# Patient Record
Sex: FEMALE | Race: WHITE
[De-identification: names, ages, dates, MRNs, and addresses within clinical notes are randomized per-mention and may not be internally consistent; named-entity substitution may affect disease eponyms.]

---

## 2017-07-08 ENCOUNTER — HOSPITAL ENCOUNTER (EMERGENCY)
Dept: HOSPITAL 25 - UCCORT | Age: 42
Discharge: HOME | End: 2017-07-08
Payer: COMMERCIAL

## 2017-07-08 VITALS — DIASTOLIC BLOOD PRESSURE: 89 MMHG | SYSTOLIC BLOOD PRESSURE: 129 MMHG
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
## 2017-07-08 DIAGNOSIS — S76.011A: Primary | ICD-10-CM
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
## 2017-07-08 DIAGNOSIS — S73.101A: ICD-10-CM

## 2017-07-08 PROCEDURE — 96372 THER/PROPH/DIAG INJ SC/IM: CPT

## 2017-07-08 PROCEDURE — 99211 OFF/OP EST MAY X REQ PHY/QHP: CPT

## 2017-07-08 PROCEDURE — G0463 HOSPITAL OUTPT CLINIC VISIT: HCPCS

## 2017-07-08 NOTE — UC
Hip/Pelvis Pain





- HPI Summary


HPI Summary: 





Patient was dancing, right hip gave out and she fell, pain and tingling down 

the right leg.





- History Of Current Complaint


Chief Complaint: UCLowerExtremity


Stated Complaint: RIGHT HIP PAIN


Time Seen by Provider: 07/08/17 20:12


Hx Obtained From: Patient


Hx Last Menstrual Period: TUBAL


Pregnant?: No


Onset/Duration: Sudden Onset, Lasting Days


Timing: Constant


Severity Initially: Moderate


Severity Currently: Moderate


Location: Discrete At: - right hip


Character Of Pain: Spasmodic, Stiffness


Aggravating Factor(s): Movement, Weight Bearing


Alleviating Factor(s): Nothing


Associated Signs And Symptoms: Positive: Weakness





- Risk Factors


Septic Arthritis Risk Factor: Negative





- Allergies/Home Medications


Allergies/Adverse Reactions: 


 Allergies











Allergy/AdvReac Type Severity Reaction Status Date / Time


 


No Known Allergies Allergy   Verified 07/08/17 19:42











Home Medications: 


 Home Medications





Ibuprofen TAB* [Motrin TAB* 800 MG] 800 mg PO Q6H PRN 07/08/17 [History 

Confirmed 07/08/17]











PMH/Surg Hx/FS Hx/Imm Hx


Previously Healthy: Yes





- Surgical History


Surgical History: Yes


Surgery Procedure, Year, and Place: gastric bypass, gallbladder removed.





- Family History


Known Family History: 


   Negative: Diabetes





- Social History


Alcohol Use: None


Substance Use Type: None


Smoking Status (MU): Never Smoked Tobacco





Review of Systems


Constitutional: Negative


Skin: Negative


Eyes: Negative


ENT: Negative


Respiratory: Negative


Cardiovascular: Negative


Gastrointestinal: Negative


Genitourinary: Negative


Motor: Negative


Neurovascular: Negative


Musculoskeletal: Arthralgia, Decreased ROM, Myalgia


Neurological: Negative


Psychological: Negative


All Other Systems Reviewed And Are Negative: Yes





Physical Exam


Triage Information Reviewed: Yes


Appearance: Well-Appearing, Well-Nourished, Pain Distress


Vital Signs: 


 Initial Vital Signs











Temp  97.3 F   07/08/17 19:35


 


Pulse  77   07/08/17 19:35


 


Resp  12   07/08/17 19:35


 


BP  129/89   07/08/17 19:35


 


Pulse Ox  100   07/08/17 19:35











Vital Signs Reviewed: Yes


Eye Exam: Normal


ENT Exam: Normal


ENT: Positive: Pharynx normal, Pharyngeal erythema, TMs normal


Dental Exam: Normal


Neck exam: Normal


Respiratory Exam: Normal


Cardiovascular Exam: Normal


Abdominal Exam: Normal


Bowel Sounds: Positive: Present


Musculoskeletal: Positive: No Edema, Strength Limited @ - in right hip, knee 

ROM normal, right hip limited in flexion and extention due to pain, ROM Limited 

@


Neurological Exam: Normal


Psychological Exam: Normal


Skin Exam: Normal





Hip Injury Course/Dx





- Course


Course Of Treatment: hx obtained, exam performed, meds reviewed toradol given





- Differential Dx/Diagnosis


Differential Diagnosis/HQI/PQRI: Contusion, Dislocation, Fracture, Sciatica, 

Sprain, Strain


Provider Diagnoses: right hip strain.  right hip sprain





Discharge





- Discharge Plan


Condition: Stable


Disposition: HOME


Patient Education Materials:  Hip Pain (ED)


Referrals: 


Marti Canseco PA [Primary Care Provider] - 


Boom Wilkinson MD [Medical Doctor] - 


Additional Instructions: 


1. rest, ice or heat as tolerated, continue with ibuprofen starting tomorrow 

for pain. 


2. If pain and stability are not improving over the next week follow up with Dr Wilkinson the orthopedic,

## 2017-07-08 NOTE — RAD
HISTORY: Fall



COMPARISONS: None



VIEWS: 3, Frontal view of the pelvis with frontal and frog-leg views of the left hip



FINDINGS:



BONE DENSITY: Normal.

BONES: There is no displaced fracture.    

JOINTS: There is no arthropathy.    

ALIGNMENT: There is no dislocation. 

SOFT TISSUES: Unremarkable.



OTHER FINDINGS: None.



IMPRESSION: 

NO RADIOGRAPHIC EVIDENCE FOR HIP FRACTURE. AS X-RAYS MAY BE NEGATIVE WITH NONDISPLACED HIP

FRACTURE, IF THERE IS PERSISTENT CLINICAL CONCERN, RECOMMEND CONSIDERATION OF MRI. IN THE

SETTING OF CONTRAINDICATION TO MRI OR LIMITATION IN EMERGENT ACCESS TO MRI, CT WOULD BE

SUGGESTED.

## 2017-08-27 ENCOUNTER — HOSPITAL ENCOUNTER (EMERGENCY)
Dept: HOSPITAL 25 - UCCORT | Age: 42
Discharge: HOME | End: 2017-08-27
Payer: COMMERCIAL

## 2017-08-27 VITALS — SYSTOLIC BLOOD PRESSURE: 117 MMHG | DIASTOLIC BLOOD PRESSURE: 64 MMHG

## 2017-08-27 DIAGNOSIS — Z98.84: ICD-10-CM

## 2017-08-27 DIAGNOSIS — M77.9: Primary | ICD-10-CM

## 2017-08-27 PROCEDURE — 99212 OFFICE O/P EST SF 10 MIN: CPT

## 2017-08-27 PROCEDURE — G0463 HOSPITAL OUTPT CLINIC VISIT: HCPCS

## 2017-08-27 NOTE — UC
Hand/Wrist HPI





- HPI Summary


HPI Summary: 





patient has had increased right hand and thumb pain, she uses her hands alot a 

work, cant move the thumb without pain.





- History Of Current Complaint


Chief Complaint: UCUpperExtremity


Stated Complaint: RIGHT HAND PAIN


Time Seen by Provider: 08/27/17 13:41


Hx Obtained From: Patient


Hx Last Menstrual Period: 8/15/17


Pregnant?: No


Onset/Duration: Gradual Onset, Lasting Days


Severity Initially: Moderate


Severity Currently: Severe


Character Of Pain: Aching, Stiffness


Aggravating Factor(s): Movement


Associated Signs And Symptoms: Positive: Weakness


Related History: Dominant Hand Right





- Allergies/Home Medications


Allergies/Adverse Reactions: 


 Allergies











Allergy/AdvReac Type Severity Reaction Status Date / Time


 


No Known Allergies Allergy   Verified 08/27/17 13:30














PMH/Surg Hx/FS Hx/Imm Hx


Previously Healthy: Yes





- Surgical History


Surgical History: Yes


Surgery Procedure, Year, and Place: gastric bypass, gallbladder removed.





- Family History


Known Family History: 


   Negative: Diabetes





- Social History


Alcohol Use: None


Substance Use Type: None


Smoking Status (MU): Never Smoked Tobacco





Review of Systems


Constitutional: Negative


Skin: Negative


Eyes: Negative


ENT: Negative


Respiratory: Negative


Cardiovascular: Negative


Gastrointestinal: Negative


Genitourinary: Negative


Motor: Negative


Neurovascular: Negative


Musculoskeletal: Arthralgia, Decreased ROM, Edema, Myalgia


Neurological: Negative


Psychological: Negative


All Other Systems Reviewed And Are Negative: Yes





Physical Exam


Triage Information Reviewed: Yes


Appearance: Well-Appearing, Pain Distress, Obese


Vital Signs: 


 Initial Vital Signs











Temp  97.9 F   08/27/17 13:24


 


Pulse  65   08/27/17 13:24


 


Resp  16   08/27/17 13:24


 


BP  117/64   08/27/17 13:24


 


Pulse Ox  100   08/27/17 13:24











Vital Signs Reviewed: Yes


Eye Exam: Normal


ENT Exam: Normal


Dental Exam: Normal


Neck exam: Normal


Respiratory Exam: Normal


Respiratory: Positive: Chest non-tender, Lungs clear, Normal breath sounds


Cardiovascular Exam: Normal


Cardiovascular: Positive: RRR, No Murmur, Pulses Normal


Abdominal Exam: Normal


Abdomen Description: Positive: Nontender, No Organomegaly, Soft


Bowel Sounds: Positive: Present


Musculoskeletal: Positive: Strength Limited @ - in right hand , ROM Limited 

@ - in thumb and wrist right hand, Edema @ - in right wrist


Neurological Exam: Normal


Psychological Exam: Normal


Skin Exam: Normal





Hand/Wrist Course/Dx





- Course


Course Of Treatment: hx obtained, exam performed ,meds reviewed, educated on 

proper stretching of hand and wrist. after demonstration, patients pain was 

gone.





- Differential Dx/Diagnosis


Differential Diagnosis/HQI/PQRI: Bursitis, Sprain, Strain, Tendonitis


Provider Diagnoses: dequeriviens tendonitis.  right wrist felxor tendonitis





Discharge





- Discharge Plan


Condition: Stable


Disposition: HOME


Patient Education Materials:  De Quervain Disease (ED)


Additional Instructions: 


1. continue with the stretches that we talked about


2. .You would benefit from a massage of your upper extermities


3. use the splint for work to help stabilize the hand


4. warm water soaks and NSaids for pain and inflammation

## 2018-09-07 ENCOUNTER — HOSPITAL ENCOUNTER (EMERGENCY)
Dept: HOSPITAL 25 - UCCORT | Age: 43
Discharge: HOME | End: 2018-09-07
Payer: COMMERCIAL

## 2018-09-07 VITALS — SYSTOLIC BLOOD PRESSURE: 124 MMHG | DIASTOLIC BLOOD PRESSURE: 77 MMHG
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
## 2018-09-07 DIAGNOSIS — S86.812A: Primary | ICD-10-CM

## 2018-09-07 PROCEDURE — 99212 OFFICE O/P EST SF 10 MIN: CPT

## 2018-09-07 PROCEDURE — G0463 HOSPITAL OUTPT CLINIC VISIT: HCPCS

## 2018-09-07 NOTE — RAD
HISTORY: pain in calf, and thigh,



COMPARISONS: None relevant



TECHNIQUE: Multiple transverse and longitudinal ultrasound images were obtained of the

left lower extremity from the level of the common femoral vein inferiorly through to the

infrapopliteal veins  using grayscale, color Doppler, and spectral Doppler imaging with

and without compression and with augmentation. Comparison images were obtained of the

contralateral common femoral vein.



FINDINGS:

VEINS: The venous system of the left lower extremity is compressible throughout its

course, with normal flow on color Doppler imaging and normal response to augmentation on

spectral Doppler imaging.



SOFT TISSUES: Unremarkable.



OTHER FINDINGS: None.



IMPRESSION: 

NO LEFT LOWER EXTREMITY DEEP VEIN THROMBOSIS

## 2018-09-07 NOTE — ED
Lower Extremity





- HPI Summary


HPI Summary: 





43 yr old female with the complaint of left leg, and thigh pain.  Onset of pain 

3-4 days ago.  No injury, and she doesn't recall a specific activity or moment 

that her symptoms started.  No fall, trauma, twisting or sudden injuries.  





She states her left calf hurts with movement of foot and she has discomfort and 

ache up in the medial thigh as well.  She denies fever, chills, long trips, SOB

, CP.  She has no other complaints.   





- History of Current Complaint


Chief Complaint: UCLowerExtremity


Stated Complaint: LEFT ANKLE INJURY


Time Seen by Provider: 09/07/18 10:50


Hx Last Menstrual Period: 9/1/18


Pain Intensity: 8





- Allergies/Home Medications


Allergies/Adverse Reactions: 


 Allergies











Allergy/AdvReac Type Severity Reaction Status Date / Time


 


No Known Allergies Allergy   Verified 09/07/18 10:49











Home Medications: 


 Home Medications





Ibuprofen TAB* [Advil TAB*] 1,000 mg PO BID PRN 09/07/18 [History Confirmed 09/ 07/18]











PMH/Surg Hx/FS Hx/Imm Hx





- Surgical History


Surgery Procedure, Year, and Place: gastric bypass, gallbladder removed.


Infectious Disease History: No


Infectious Disease History: Reports: Hx Shingles


   Denies: Hx Clostridium Difficile, Hx Hepatitis, Hx Human Immunodeficiency 

Virus (HIV), Hx of Known/Suspected MRSA, Hx Tuberculosis, Hx Known/Suspected VRE

, Hx Known/Suspected VRSA, History Other Infectious Disease, Traveled Outside 

the  in Last 30 Days





- Family History


Known Family History: Positive: None


   Negative: Diabetes


Family History: Denies





- Social History


Alcohol Use: None


Substance Use Type: Reports: None


Smoking Status (MU): Never Smoked Tobacco





Review of Systems


Constitutional: Negative


Eyes: Negative


Positive: Other - left calf and thigh pain


All Other Systems Reviewed And Are Negative: Yes





Physical Exam


Triage Information Reviewed: Yes


Vital Signs On Initial Exam: 


 Initial Vitals











Temp Pulse Resp BP Pulse Ox


 


 97.8 F   80   17   124/77   100 


 


 09/07/18 10:50  09/07/18 10:50  09/07/18 10:50  09/07/18 10:50  09/07/18 10:50











Vital Signs Reviewed: Yes


Appearance: Positive: Well-Appearing


Skin: Positive: Warm, Skin Color Reflects Adequate Perfusion


Head/Face: Positive: Normal Head/Face Inspection


Eyes: Positive: EOMI


Respiratory/Lung Sounds: Positive: Clear to Auscultation, Breath Sounds Present


Cardiovascular: Positive: RRR.  Negative: Murmur


Abdomen Description: Negative: Distended


Musculoskeletal: Positive: Edema Left - slight left calf, tenderness left calf 

and left medial thigh.  No ankle or knee effusion.  No medial or lateral 

malleolus tenderness left ankle.  No achilles tendon swelling, bruising or 

tenderness.


Neurological: Positive: Sensory/Motor Intact, Alert, Oriented to Person Place, 

Time, CN Intact II-III


Psychiatric: Positive: Normal





Diagnostics





- Vital Signs


 Vital Signs











  Temp Pulse Resp BP Pulse Ox


 


 09/07/18 10:50  97.8 F  80  17  124/77  100














- Laboratory


Lab Statement: Any lab studies that have been ordered have been reviewed, and 

results considered in the medical decision making process.





- Additional Comments


Diagnostic Additional Comments: 





Venous doppler left leg negative for DVT per radiologist. 





Lower Extremity Course/Dx





- Course


Course Of Treatment: 43 yr old with negative US left lower extremity.  She 

likely has a calf muscle strain.   Crutches.  Bear weight as tolerates.  FU 

with PMD, and Ortho referral given.





- Diagnoses


Provider Diagnoses: 


 Strain of calf muscle








Discharge





- Sign-Out/Discharge


Documenting (check all that apply): Patient Departure


All imaging exams completed and their final reports reviewed: Yes





- Discharge Plan


Condition: Good


Disposition: HOME


Prescriptions: 


Ibuprofen TAB* [Motrin TAB* 600 MG] 600 mg PO Q6H PRN #20 tab


 PRN Reason: Pain


Patient Education Materials:  Muscle Strain (ED)


Referrals: 


Marti Canseco PA [Primary Care Provider] - 2 Days





- Billing Disposition and Condition


Condition: GOOD


Disposition: Home

## 2019-12-19 ENCOUNTER — HOSPITAL ENCOUNTER (EMERGENCY)
Dept: HOSPITAL 25 - UCCORT | Age: 44
Discharge: HOME | End: 2019-12-19
Payer: COMMERCIAL

## 2019-12-19 VITALS — DIASTOLIC BLOOD PRESSURE: 67 MMHG | SYSTOLIC BLOOD PRESSURE: 124 MMHG
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
## 2019-12-19 DIAGNOSIS — G25.81: ICD-10-CM
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
## 2019-12-19 DIAGNOSIS — M19.041: ICD-10-CM

## 2019-12-19 DIAGNOSIS — S66.811A: Primary | ICD-10-CM

## 2019-12-19 PROCEDURE — G0463 HOSPITAL OUTPT CLINIC VISIT: HCPCS

## 2019-12-19 PROCEDURE — 99213 OFFICE O/P EST LOW 20 MIN: CPT

## 2019-12-19 NOTE — UC
Hand/Wrist HPI





- HPI Summary


HPI Summary: 





45 yo woman with injury to the right thumb within the hour. She pulled her car 

door open with her right hand, with immediate pain in the entire thumb, 

radiating to the forearm. States that she cannot move her thumb. 





- History Of Current Complaint


Chief Complaint: UCUpperExtremity


Stated Complaint: THUMB INJ


Time Seen by Provider: 12/19/19 21:44


Hx Obtained From: Patient


Hx Last Menstrual Period: 12/1/19


Onset/Duration: Sudden Onset, Lasting Minutes


Severity Initially: Moderate


Severity Currently: Moderate


Pain Intensity: 10


Character Of Pain: Throbbing


Aggravating Factor(s): Movement


Alleviating Factor(s): Rest, Ice


Associated Signs And Symptoms: Positive: Swelling - mild swelling of the right 

thumb


Related History: Dominant Hand Right





- Allergies/Home Medications


Allergies/Adverse Reactions: 


 Allergies











Allergy/AdvReac Type Severity Reaction Status Date / Time


 


No Known Allergies Allergy   Verified 12/19/19 21:40











Home Medications: 


 Home Medications





Ropinirole ER (NF) [Ropinirole ER (Nf)] 8 mg PO DAILY 12/19/19 [History 

Confirmed 12/19/19]











PMH/Surg Hx/FS Hx/Imm Hx


Previously Healthy: Yes


Neurological History: Other - restless leg syndrome





- Surgical History


Surgical History: Yes


Surgery Procedure, Year, and Place: gastric bypass, gallbladder removed.





- Family History


Known Family History: Positive: Non-Contributory


   Negative: Diabetes


Family History: Denies





- Social History


Occupation: Employed Full-time


Alcohol Use: None


Substance Use Type: None


Smoking Status (MU): Never Smoked Tobacco





Review of Systems


All Other Systems Reviewed And Are Negative: Yes


Constitutional: Positive: Negative


Skin: Positive: Negative


Eyes: Positive: Negative


ENT: Positive: Negative


Respiratory: Positive: Negative


Cardiovascular: Positive: Negative


Gastrointestinal: Positive: Negative


Genitourinary: Positive: Negative


Motor: Positive: Decreased ROM, Other - pain right thumb


Neurovascular: Positive: Negative


Musculoskeletal: Positive: Negative


Neurological: Positive: Negative


Psychological: Positive: Negative


Is Patient Immunocompromised?: No





Physical Exam


Triage Information Reviewed: Yes


Appearance: Obese


Vital Signs: 


 Initial Vital Signs











Temp  98 F   12/19/19 21:42


 


Pulse  79   12/19/19 21:42


 


Resp  18   12/19/19 21:42


 


BP  124/67   12/19/19 21:42


 


Pulse Ox  100   12/19/19 21:42











Eye Exam: Normal


ENT: Positive: Normal ENT inspection


Respiratory Exam: Normal


Cardiovascular: Positive: RRR, No Murmur


Bowel Sounds: Positive: Present


Musculoskeletal: Positive: Strength Intact, ROM Limited @ - right thumb with 

mild diffuse swelling, no obvious deformity,


Neurological Exam: Normal


Neurological: Positive: Alert





Diagnostics





- Radiology


  ** No standard instances


Radiology Interpretation Completed By: ED Physician - severe degenerative 

change DIP joint of thumb, evidence of degenerative change first prison. Lytic 

areas middle phalanx. No fracture.





Hand/Wrist Course/Dx





- Course


Course Of Treatment: 





Thumb spica and referral Reviewed abnormal xray of the thumb, advised 

orthopedic assessment. 





- Differential Dx/Diagnosis


Differential Diagnosis/HQI/PQRI: Fracture, Sprain, Strain


Provider Diagnosis: 


 Strain of right thumb








Discharge ED





- Sign-Out/Discharge


Documenting (check all that apply): Patient Departure


All imaging exams completed and their final reports reviewed: No





- Discharge Plan


Condition: Stable


Disposition: HOME


Patient Education Materials:  Finger Sprain (ED)


Referrals: 


Marti Canseco PA [Primary Care Provider] - 


Boom Wilkinson MD [Medical Doctor] - 


Additional Instructions: 


My impression of the xray is that there is not a fracture, but that you have 

significant arthritic change in the thumb. 


The pattern of pain is most consistent with a strain/sprain of the tendon. 


Please wear the splint and arrange a visit with Dr. Wilkinson for evaluation.


Continue use of ice to the thumb, and use ibuprofen 600mg every 6 hours as 

needed for control of pain. 





- Billing Disposition and Condition


Condition: STABLE


Disposition: Home

## 2019-12-21 NOTE — UC
- Progress Note


Progress Note: 





Reviewed radiology report of thumb xray. No change from wet reading, no change 

in management. 





Patient Name:         GETACHEW NAVAS                                           

                        Medical Record#: [id number]


Ordering Physician: Ly Reddy MD                                           

                        Acct.#: [id number]


:     1975         Age: 44   Sex: F                                   

                        Location: URGENT [facility]


Exam Date: 19                                                       

                        ADM Status: DEP ER


Order Information:                         THUMB RIGHT


Accession Number:                          [id number]


CPT:                                       [id number]


INDICATION:  Right thumb injury.





TECHNIQUE: 3 views of the right thumb were obtained.





FINDINGS:  There is diffuse soft tissue swelling in the thumb. No acute 

fracture is seen. 








There is moderate to severe osteoarthritic change in the first carpometacarpal 

carpal and


interphalangeal joints and mild to moderate osteoarthritic change in the 

metacarpal


phalangeal joint.





IMPRESSION:


1. NO EVIDENCE FOR FRACTURE.


2. OSTEOARTHRITIC CHANGE.





R2








Preliminary Imaging Read 


R2                                                                         





____________________________________________________________


<Electronically signed by Damon Pizarro MD in OV>  19


Dictated By: Damon Pizarro MD


Dictated Date/Time: 19


Transcribed Date/Time: 19


Copy to:











CC:Marti BINGHAM; Ly Reddy MD


Imaging - [facility]                                 Imaging - [facility] [place] 


[address]                                       [address] [address]


ph ([phone])                                     ph ([phone])        

                                        ph ([phone]) 




















This report is only to be considered final once signed by the Provider(s) as 

displayed in the "<Electronically Signed by >" field (s). Absence of a 


signature indicates the report is in a draft status and still needs to be 

finalized. In the event this document was created by someone other than the 


signing Provider, the individual initiating the document will be listed in the 

"Entered by:" or "Dictated by:" fields.


                                                                 1 of 1








Course/Dx





- Diagnoses


Provider Diagnoses: 


 Strain of right thumb








Discharge ED





- Sign-Out/Discharge


Documenting (check all that apply): Post-Discharge Follow Up


All imaging exams completed and their final reports reviewed: Yes





- Discharge Plan


Condition: Stable


Disposition: HOME


Patient Education Materials:  Finger Sprain (ED)


Referrals: 


Boom Wilkinson MD [Medical Doctor] - 


Marti Canseco PA [Primary Care Provider] - 


Additional Instructions: 


My impression of the xray is that there is not a fracture, but that you have 

significant arthritic change in the thumb. 


The pattern of pain is most consistent with a strain/sprain of the tendon. 


Please wear the splint and arrange a visit with Dr. Wilkinson for evaluation.


Continue use of ice to the thumb, and use ibuprofen 600mg every 6 hours as 

needed for control of pain. 





- Billing Disposition and Condition


Condition: STABLE


Disposition: Home